# Patient Record
Sex: FEMALE | Race: BLACK OR AFRICAN AMERICAN | NOT HISPANIC OR LATINO | URBAN - METROPOLITAN AREA
[De-identification: names, ages, dates, MRNs, and addresses within clinical notes are randomized per-mention and may not be internally consistent; named-entity substitution may affect disease eponyms.]

---

## 2023-10-11 ENCOUNTER — OFFICE VISIT (OUTPATIENT)
Dept: URGENT CARE | Facility: URGENT CARE | Age: 58
End: 2023-10-11
Payer: COMMERCIAL

## 2023-10-11 VITALS
TEMPERATURE: 98.4 F | WEIGHT: 268 LBS | SYSTOLIC BLOOD PRESSURE: 183 MMHG | OXYGEN SATURATION: 98 % | DIASTOLIC BLOOD PRESSURE: 102 MMHG | HEART RATE: 81 BPM

## 2023-10-11 DIAGNOSIS — I10 PRIMARY HYPERTENSION: ICD-10-CM

## 2023-10-11 DIAGNOSIS — M54.12 CERVICAL RADICULOPATHY: Primary | ICD-10-CM

## 2023-10-11 PROCEDURE — 99204 OFFICE O/P NEW MOD 45 MIN: CPT

## 2023-10-11 RX ORDER — ALBUTEROL SULFATE 90 UG/1
2 AEROSOL, METERED RESPIRATORY (INHALATION) EVERY 6 HOURS PRN
COMMUNITY

## 2023-10-11 RX ORDER — CYCLOBENZAPRINE HCL 10 MG
10 TABLET ORAL 3 TIMES DAILY PRN
Qty: 21 TABLET | Refills: 0 | Status: SHIPPED | OUTPATIENT
Start: 2023-10-11

## 2023-10-11 RX ORDER — LOSARTAN POTASSIUM 50 MG/1
50 TABLET ORAL DAILY
Qty: 30 TABLET | Refills: 3 | Status: SHIPPED | OUTPATIENT
Start: 2023-10-11

## 2023-10-11 RX ORDER — PREDNISONE 20 MG/1
TABLET ORAL
Qty: 20 TABLET | Refills: 0 | Status: SHIPPED | OUTPATIENT
Start: 2023-10-11

## 2023-10-12 NOTE — PROGRESS NOTES
Cervical radiculopathy  - cyclobenzaprine (FLEXERIL) 10 MG tablet; Take 1 tablet (10 mg) by mouth 3 times daily as needed for muscle spasms  - predniSONE (DELTASONE) 20 MG tablet; Take 3 tabs by mouth daily x 3 days, then 2 tabs daily x 3 days, then 1 tab daily x 3 days, then 1/2 tab daily x 3 days.    Rest the affected area as much as possible.  Apply ice for 15-20 minutes intermittently as needed and especially after any offending activity. Hot packs are better for muscle spasms and cramping. Daily stretching as tolerated.  As pain recedes, begin normal activities slowly as tolerated.  Consider Physical Therapy after 6 weeks if symptoms not better with conservative care.      Okay to take acetaminophen 500 mg- 2 tabs (Total of 1000 mg) every 8 hrs   Okay to take ibuprofen 200 mg- 3 tabs (Total of 600 mg) every 6 hours      Patient was advised to return to clinic for reevaluation (either UC or PCP) if symptoms do not improve in 5 days. Patient educated on red flag symptoms and asked to go directly to the ED if these symptoms present themselves.       Primary hypertension  - losartan (COZAAR) 50 MG tablet; Take 1 tablet (50 mg) by mouth daily    Blood pressure reviewed with patient. Patient was educated on BP goals and advised to monitor BP at home 2-3 times daily. Low salt diet recommended. Healthy diet and exercise reviewed.  Limit pop and juice intake.  Encourage exercise of at least 20 min per day.  Limit sedentary time to one hour a day. Mediterranean/pescatarian diet best for weight maintenance and cardiovascular health. Follow up with PCP in 1-3 months for any medication adjustments.     BOB[facility]    Subjective   58 year old who presents to clinic today for the following health issues:    Urgent Care       HPI   Where in your body do you have pain? Musculoskeletal problem/pain  Onset/Duration: Right arm pain, near elbow, shooting pain   x 3 weeks - pain worsening  with use - pain came on gradually.   Description  Location: Right arm (from shoulder to forearm) patient has some neck pain on the right (She states this is mild)   Joint Swelling: No  Redness: No  Pain: YES  Warmth: No  Intensity:  moderate to severe   Progression of Symptoms:  worsening  Accompanying signs and symptoms:   Fevers: No  Numbness/tingling/weakness: Some numbness and tinging of the right hand   History  Trauma to the area: No  Recent illness:  No  Previous similar problem: No  Previous evaluation:  No  Precipitating or alleviating factors:  Aggravating factors include: most movement of the shoulder or elbow.   Therapies tried and outcome: rest/inactivity    Review of Systems   Review of Systems   See HPI    Objective    Temp: 98.4  F (36.9  C) Temp src: Tympanic BP: (!) 183/102 Pulse: 81     SpO2: 98 %       Physical Exam   Physical Exam  Constitutional:       General: She is not in acute distress.     Appearance: Normal appearance. She is normal weight. She is not ill-appearing, toxic-appearing or diaphoretic.   HENT:      Head: Normocephalic and atraumatic.      Right Ear: Tympanic membrane, ear canal and external ear normal. There is no impacted cerumen.      Left Ear: Tympanic membrane, ear canal and external ear normal. There is no impacted cerumen.      Nose: Nose normal. No congestion or rhinorrhea.      Mouth/Throat:      Mouth: Mucous membranes are moist.      Pharynx: Oropharynx is clear. No oropharyngeal exudate or posterior oropharyngeal erythema.   Cardiovascular:      Rate and Rhythm: Normal rate and regular rhythm.      Pulses: Normal pulses.      Heart sounds: Normal heart sounds. No murmur heard.     No friction rub. No gallop.   Pulmonary:      Effort: Pulmonary effort is normal. No respiratory distress.      Breath sounds: No stridor. No wheezing, rhonchi or rales.   Chest:      Chest wall: No tenderness.   Abdominal:      General: Abdomen is flat. Bowel sounds are normal. There is  no distension.      Palpations: There is no shifting dullness, fluid wave, hepatomegaly, splenomegaly, mass or pulsatile mass.      Tenderness: There is no abdominal tenderness. There is no right CVA tenderness, left CVA tenderness, guarding or rebound. Negative signs include Murray's sign, Rovsing's sign, McBurney's sign, psoas sign and obturator sign.      Hernia: No hernia is present.   Lymphadenopathy:      Cervical: No cervical adenopathy.   Neurological:      General: No focal deficit present.      Mental Status: She is alert and oriented to person, place, and time. Mental status is at baseline.      Gait: Gait normal.   Psychiatric:         Mood and Affect: Mood normal.         Behavior: Behavior normal.         Thought Content: Thought content normal.         Judgment: Judgment normal.          No results found for this or any previous visit (from the past 24 hour(s)).

## 2024-05-14 ENCOUNTER — ANCILLARY PROCEDURE (OUTPATIENT)
Dept: GENERAL RADIOLOGY | Facility: CLINIC | Age: 59
End: 2024-05-14
Attending: PHYSICIAN ASSISTANT
Payer: COMMERCIAL

## 2024-05-14 ENCOUNTER — OFFICE VISIT (OUTPATIENT)
Dept: URGENT CARE | Facility: URGENT CARE | Age: 59
End: 2024-05-14
Payer: COMMERCIAL

## 2024-05-14 VITALS
DIASTOLIC BLOOD PRESSURE: 106 MMHG | WEIGHT: 271 LBS | SYSTOLIC BLOOD PRESSURE: 176 MMHG | TEMPERATURE: 98.2 F | OXYGEN SATURATION: 99 % | HEART RATE: 74 BPM | RESPIRATION RATE: 16 BRPM

## 2024-05-14 DIAGNOSIS — M79.672 LEFT FOOT PAIN: ICD-10-CM

## 2024-05-14 DIAGNOSIS — S90.852A FOREIGN BODY IN LEFT FOOT, INITIAL ENCOUNTER: ICD-10-CM

## 2024-05-14 DIAGNOSIS — S90.852A FOREIGN BODY IN LEFT FOOT, INITIAL ENCOUNTER: Primary | ICD-10-CM

## 2024-05-14 PROCEDURE — 99213 OFFICE O/P EST LOW 20 MIN: CPT | Performed by: PHYSICIAN ASSISTANT

## 2024-05-14 PROCEDURE — 73650 X-RAY EXAM OF HEEL: CPT | Mod: TC | Performed by: RADIOLOGY

## 2024-05-14 NOTE — PROGRESS NOTES
Assessment & Plan     Foreign body in left foot, initial encounter    PROCEDURE  1/2 ml l% lidocaine with epi injected  11 blade used to explore already debrided and cut into wound, and there is no foreign body found  There is not even an entry point found for glass    Xray heel NEG for foreign body Bartolo Farnsworth, [facility] PA-C    - XR Calcaneus Left G/E 2 Views; Future  - Orthopedic  Referral; Future    Left foot pain    Patient states she stepped on glass 4-5 days ago  Now the foot hurts and she can't find the glass, its further in her foot    I have been unable to find foreign body  Referral to Surgical podiatrist  - Orthopedic  Referral; Future    Nicotine/Tobacco Cessation  She reports that she has been smoking cigarettes. She has never used smokeless tobacco.  Nicotine/Tobacco Cessation Plan      CONSULTATION/REFERRAL to Podiatry    No follow-ups on file.    Won Raphael is a 58 year old, presenting for the following health issues:  Derm Problem (Onset: 5/10/24; Pt says that she has a glass sliver in her L-foot in the heel area. Pt tried to remove the sliver multiple times to remove it unsuccessfully. Pt states sliver is still in. )    HPI       Review of Systems  Constitutional, HEENT, cardiovascular, pulmonary, gi and gu systems are negative, except as otherwise noted.      Objective    BP (!) 176/106 (BP Location: Left arm, Patient Position: Sitting, Cuff Size: Adult Regular)   Pulse 74   Temp 98.2  F (36.8  C) (Tympanic)   Resp 16   Wt 122.9 kg (271 lb)   SpO2 99%   There is no height or weight on file to calculate BMI.  Physical Exam   GENERAL: alert and no distress  MS: pos for left heel pain,  she has dug into skin and there is an open area of tissue as area has been cut into and skin peeled away  SKIN: pos  NEURO: Normal strength and tone, mentation intact and speech normal  PSYCH: mentation appears normal, affect normal/bright    Xray foot Negative for acute findings,  read by Bartolo PRATT at time of visit.          Signed Electronically by: Bartolo Farnsworth, SANTA, BOBY

## 2024-05-19 NOTE — TELEPHONE ENCOUNTER
DIAGNOSIS:   Foreign body in left foot, initial encounter [S90.852A]  - Primary  Left foot pain [M79.672]   APPOINTMENT DATE: 05/21/2024   NOTES STATUS DETAILS   OFFICE NOTE from referring provider Internal 05/14/2024 - [facility]   XRAYS (IMAGES & REPORTS) Internal

## 2024-05-21 ENCOUNTER — PRE VISIT (OUTPATIENT)
Dept: ORTHOPEDICS | Facility: CLINIC | Age: 59
End: 2024-05-21

## 2024-05-21 ENCOUNTER — OFFICE VISIT (OUTPATIENT)
Dept: ORTHOPEDICS | Facility: CLINIC | Age: 59
End: 2024-05-21
Attending: PHYSICIAN ASSISTANT
Payer: COMMERCIAL

## 2024-05-21 VITALS — BODY MASS INDEX: 47.46 KG/M2 | WEIGHT: 278 LBS | HEIGHT: 64 IN

## 2024-05-21 DIAGNOSIS — M79.672 LEFT FOOT PAIN: ICD-10-CM

## 2024-05-21 DIAGNOSIS — S90.852A FOREIGN BODY IN LEFT FOOT, INITIAL ENCOUNTER: ICD-10-CM

## 2024-05-21 PROCEDURE — 99203 OFFICE O/P NEW LOW 30 MIN: CPT | Performed by: ORTHOPAEDIC SURGERY

## 2024-05-21 NOTE — LETTER
"    5/21/2024         RE: Ijeoma Patel  [id number]  [facility] [id number]        Dear Colleague,    Thank you for referring your patient, Ijeoma Patel, to the [facility] [place]. Please see a copy of my visit note below.    Reason For Visit:   Chief Complaint   Patient presents with    Consult     Glass in left foot. Patient knocked wine glass off of counter, Patient states she tried to pull glass out but it wouldn't come out. Had urgent care visit, got imaging and was told wine glass is hard to see on x-ray so was referred to ortho.        Ht 1.626 m (5' 4\")   Wt 126.1 kg (278 lb)   BMI 47.72 kg/m      Pain Assessment  Patient Currently in Pain: Marita Kline CMA      Chief complaint: Status post puncture to plantar aspect the left foot sustained on May 13, 2024    Patient is a 58-year-old female who presents today for evaluation of the left foot.  Patient reports to have knocked down a wine glass from a counter and eventually to stepped on the glass.  Eventually she presented to the local ER where she was under the impression to have a foreign body in the foot however this could not be visualized on x-rays.  She was told to follow-up with us.    Patient reports to have quite a bit of pain and discomfort on the plantar aspect of the foot and is still to be under the impression that there is a foreign body within the foot itself.    Patient reports to be otherwise somewhat active and reports to walk and a [employer] shop which is a combination of sitting and standing.    I reviewed today her past medical and surgical history current medications and drug allergies.    On today's visit she presents as a pleasant female in no apparent distress with a weight of 278 pounds and a BMI of 47.7.    On today's visit she presents with a puncture wound along the plantar aspect of the foot which is noninfected.  There is some very mild discomfort on palpation.  I could not " feel any induration or suggestion of a foreign body deep.  From the dermis.  Presents with otherwise full range of motion of the ankle hindfoot and midfoot joints    Plain x-rays of the foot were reviewed today which again do not show to have any radiodense foreign body.    Assessment: Status post puncture to the plantar aspect of the left foot    Plan: I discussed with patient that at this point I believe that the pain that she has is just from the trauma that she has undergone.  Patient was started impression that the pain is coming from the foreign body    I offered her the possibility of undergoing an ultrasound of the foot to rule out a foreign body however she chose to proceed with observation for a couple of weeks.  If in 2 weeks from now she is not making progress although she may still have some symptoms then we will proceed with an MRI of the plantar aspect of the left foot to rule out the presence of a foreign body    All questions were answered.  Patient was pleased with the discussion. Patient will follow-up accordingly.  Otherwise patient has no restrictions.    TT 30 minutes        Steve Heredia MD

## 2024-05-21 NOTE — PROGRESS NOTES
"Reason For Visit:   Chief Complaint   Patient presents with    Consult     Glass in left foot. Patient knocked wine glass off of counter, Patient states she tried to pull glass out but it wouldn't come out. Had urgent care visit, got imaging and was told wine glass is hard to see on x-ray so was referred to ortho.        Ht 1.626 m (5' 4\")   Wt 126.1 kg (278 lb)   BMI 47.72 kg/m      Pain Assessment  Patient Currently in Pain: [place]    Philip Kline CMA    "